# Patient Record
Sex: MALE | Race: AMERICAN INDIAN OR ALASKA NATIVE | Employment: STUDENT | URBAN - METROPOLITAN AREA
[De-identification: names, ages, dates, MRNs, and addresses within clinical notes are randomized per-mention and may not be internally consistent; named-entity substitution may affect disease eponyms.]

---

## 2022-10-25 ENCOUNTER — TELEPHONE (OUTPATIENT)
Dept: ENDOCRINOLOGY | Age: 22
End: 2022-10-25

## 2022-10-25 DIAGNOSIS — E05.90 HYPERTHYROIDISM: Primary | ICD-10-CM

## 2022-10-25 NOTE — TELEPHONE ENCOUNTER
Call from pt's Dad Carla Quiroga stating he spoke w/ Dr Barth Shames about scheduling his son as a new pt and that he had labs done in [place] and he needed to be scheduled ASAP    Told dad I did not see any notes in pt's chart regarding that info and would have to take a message    # [phone]

## 2022-10-25 NOTE — TELEPHONE ENCOUNTER
Thanks. Since he is new for [facility], there is no insurance information. Since it is a virtual visit , do we need to get it now or someone will get it on Friday.

## 2022-10-26 ENCOUNTER — TELEPHONE (OUTPATIENT)
Dept: ENDOCRINOLOGY | Age: 22
End: 2022-10-26

## 2022-10-27 DIAGNOSIS — E05.90 HYPERTHYROIDISM: ICD-10-CM

## 2022-10-27 LAB
T3 FREE: 2.8 PG/ML (ref 2.3–4.2)
T4 FREE: 1.4 NG/DL (ref 0.9–1.8)
TSH REFLEX: 0.54 UIU/ML (ref 0.27–4.2)

## 2022-10-28 ENCOUNTER — TELEMEDICINE (OUTPATIENT)
Dept: ENDOCRINOLOGY | Age: 22
End: 2022-10-28
Payer: COMMERCIAL

## 2022-10-28 DIAGNOSIS — E55.9 VITAMIN D INSUFFICIENCY: ICD-10-CM

## 2022-10-28 DIAGNOSIS — E05.90 HYPERTHYROIDISM: Primary | ICD-10-CM

## 2022-10-28 PROCEDURE — 99203 OFFICE O/P NEW LOW 30 MIN: CPT | Performed by: INTERNAL MEDICINE

## 2022-10-28 RX ORDER — CIPROFLOXACIN AND DEXAMETHASONE 3; 1 MG/ML; MG/ML
4 SUSPENSION/ DROPS AURICULAR (OTIC) 2 TIMES DAILY
COMMUNITY
Start: 2022-10-28 | End: 2022-11-04

## 2022-10-28 RX ORDER — IBUPROFEN 600 MG/1
600 TABLET ORAL EVERY 6 HOURS PRN
COMMUNITY
Start: 2022-10-28

## 2022-10-28 RX ORDER — LIDOCAINE 50 MG/G
1 PATCH TOPICAL EVERY 24 HOURS
COMMUNITY
Start: 2022-10-28

## 2022-10-28 NOTE — PROGRESS NOTES
Patient was evaluated through a synchronous (real-time) audio-video  encounter. The patient (or guardian if applicable) is aware that this is a billable service, which includes applicable co-pays. This Virtual Visit was  conducted with patient's (and/or legal guardian's) consent. The visit was conducted pursuant to the emergency declaration under the [address] [facility] authority and the [employer] and [employer] General Act. Patient identification was verified,  and a caregiver was present when appropriate. The patient was located in a state where the provider was licensed to provide care. Patient: Home  Provider location: Home    Subjective:      24 y/o  male who is here for thyroid evaluation. Referred by [facility]. He had labs which showed hyperthyroidism    End of June :  Covid-19   Out patient management    July/August difficulty staying awake  Also noted palpitations , better now  No neck pain some soreness in neck  Some headache, disorientation    9/22 TSH <0.01    FT3 4.6 FT4 1.6    TPO, Tg Ab -ve  TSI -ve    10/22 TFT normal    Current complaints: see HPI    History of radiation to patient's neck:   no  Recent iodine exposure:  no  Family history includes mom high thyroid? Dad side of family thyroid issue    Mild, controlled    No worsening factors    He had low Vitamin D    Vit D 24    B12 211    He was started on B12 and Vit D 3 29915 IU    He is okay with discussing case with his dad.       PMH:   Adjustment disorder    PSH:  Appendectomy    Current Outpatient Medications   Medication Sig Dispense Refill    ciprofloxacin-dexamethasone (CIPRODEX) 0.3-0.1 % otic suspension Place 4 drops in ear(s) 2 times daily      lidocaine (LIDODERM) 5 % Place 1 patch onto the skin every 24 hours      ibuprofen (ADVIL;MOTRIN) 600 MG tablet Take 600 mg by mouth every 6 hours as needed       No current facility-administered medications for this visit. Review of Systems  Constitutional: Negative for weight loss and malaise/fatigue. Negative for fever and chills. HENT: ear discharge, no pain, on antibitoics  Eyes: Negative for blurred vision. Negative for double vision, photophobia and pain. Respiratory:  -cough . some SOB  Cardiovascular: =+ for chest pain, previously palpitations and leg swelling. Gastrointestinal: Negative for nausea, vomiting and abdominal pain. Genitourinary: Negative for dysuria, urgency and frequency. Musculoskeletal: Negative for back pain. No joint pain  Skin: Negative for itching and rash. Neurological: Negative for dizziness. No tremors mild headache reports migraine  Endo/Heme/Allergies: see HPI  Psychiatric/Behavioral: h/o adjustment disorder  -substance abuse. Objective: There were no vitals taken for this visit. Wt Readings from Last 3 Encounters:   No data found for Wt         PHYSICAL EXAMINATION:  [ INSTRUCTIONS:  \"[x]\" Indicates a positive item  \"[]\" Indicates a negative item  -- DELETE ALL ITEMS NOT EXAMINED]  Vital Signs: (As obtained by patient/caregiver or practitioner observation)    Blood pressure-  Heart rate-    Respiratory rate-    Temperature-  Pulse oximetry-     Constitutional Appears well-developed and well-nourished No apparent distress        Mental status  Alert and awake  Oriented to person/place/time  Able to follow commands      Eyes:  EOM    [x]  Normal    Sclera  [x]  Normal           Discharge [x]  None visible      HENT:   [x] Normocephalic, atraumatic.     [x] Mouth/Throat: Mucous membranes are moist.     External Ears [x] Normal  no discharge    Neck: [x] No visualized mass  no swelling    Pulmonary/Chest: [x] Respiratory effort normal.  [x] No visualized signs of difficulty breathing or respiratory distress             Musculoskeletal:   [x] Normal gait with no signs of ataxia         [x] Normal range of motion of neck          Head and neck stable, appears normal ROM, strength good    Neurological:        [x] No Facial Asymmetry (Cranial nerve 7 motor function) (limited exam to video visit)          [x] No gaze palsy                Skin:        [x] No significant exanthematous lesions or discoloration noted on facial skin                 Psychiatric:       [x] Normal Affect [x] No Hallucinations            Other pertinent observable physical exam findings-     Due to this being a TeleHealth encounter, evaluation of the following organ systems is limited: Vitals/Constitutional/EENT/Resp/CV/GI//MS/Neuro/Skin/Heme-Lymph-Imm. Services were provided through a video synchronous discussion virtually to substitute for in-person clinic visit. Lab Review  No results found for: TSH  No results found for: FREET4      Assessment:     Hyperthyroidism: Mild, episode post Covid-19. Discussed possibility of post Covid-19 viral thyroiditis. Antibodies are negative. Levels now normal. Will monitor. Recheck to assess for hypothyroidism   Advised symptoms not due to thyroid . If persist, advised to be evaluated at [facility] for post Covid-19 persistent symptoms  Given neck symptoms, order USG    Vitamin D Insufficiency: Recommend Replacement D3, taking it. Headache/Disorientation : advised to see Neurology if symptoms persist    Plan:     1.TFT in 6 weeks  2. Await Trab  3. Thyroid USG

## 2022-10-29 LAB — TSH RECEPTOR AB: 0.87 IU/L

## 2022-11-16 LAB
BACTERIA: ABNORMAL /HPF
BILIRUBIN URINE: NEGATIVE
BLOOD, URINE: NEGATIVE
CLARITY: CLEAR
COLOR: YELLOW
EPITHELIAL CELLS, UA: 0 /HPF (ref 0–5)
GLUCOSE URINE: NEGATIVE MG/DL
HYALINE CASTS: 0 /LPF (ref 0–8)
KETONES, URINE: 40 MG/DL
LEUKOCYTE ESTERASE, URINE: NEGATIVE
MICROSCOPIC EXAMINATION: ABNORMAL
NITRITE, URINE: NEGATIVE
PH UA: 7.5 (ref 5–8)
PROTEIN UA: NEGATIVE MG/DL
RBC UA: 1 /HPF (ref 0–4)
SPECIFIC GRAVITY UA: 1.01 (ref 1–1.03)
URINE TYPE: ABNORMAL
UROBILINOGEN, URINE: 0.2 E.U./DL
WBC UA: 0 /HPF (ref 0–5)

## 2022-12-29 DIAGNOSIS — E06.9 THYROIDITIS: Primary | ICD-10-CM

## 2023-01-06 DIAGNOSIS — E55.9 VITAMIN D INSUFFICIENCY: ICD-10-CM

## 2023-01-06 DIAGNOSIS — E05.90 HYPERTHYROIDISM: ICD-10-CM

## 2023-01-07 LAB
T3 FREE: 2.5 PG/ML (ref 2.3–4.2)
T4 FREE: 0.8 NG/DL (ref 0.9–1.8)
TSH REFLEX: 4.85 UIU/ML (ref 0.27–4.2)

## 2023-01-11 ENCOUNTER — OFFICE VISIT (OUTPATIENT)
Dept: ENDOCRINOLOGY | Age: 23
End: 2023-01-11
Payer: COMMERCIAL

## 2023-01-11 VITALS
HEART RATE: 68 BPM | SYSTOLIC BLOOD PRESSURE: 120 MMHG | DIASTOLIC BLOOD PRESSURE: 66 MMHG | HEIGHT: 70 IN | WEIGHT: 183 LBS | TEMPERATURE: 98 F | BODY MASS INDEX: 26.2 KG/M2 | RESPIRATION RATE: 14 BRPM

## 2023-01-11 DIAGNOSIS — E03.9 ACQUIRED HYPOTHYROIDISM: Primary | ICD-10-CM

## 2023-01-11 PROCEDURE — 99214 OFFICE O/P EST MOD 30 MIN: CPT | Performed by: INTERNAL MEDICINE

## 2023-01-11 RX ORDER — LEVOTHYROXINE SODIUM 0.03 MG/1
TABLET ORAL
Qty: 30 TABLET | Refills: 2 | Status: SHIPPED | OUTPATIENT
Start: 2023-01-11

## 2023-01-11 RX ORDER — QUETIAPINE FUMARATE 100 MG/1
TABLET, FILM COATED ORAL
COMMUNITY

## 2023-01-11 RX ORDER — OLOPATADINE HYDROCHLORIDE 1 MG/ML
1 SOLUTION/ DROPS OPHTHALMIC 2 TIMES DAILY
COMMUNITY
Start: 2022-11-29

## 2023-01-11 RX ORDER — VILAZODONE HYDROCHLORIDE 20 MG/1
20 TABLET ORAL EVERY MORNING
COMMUNITY
Start: 2022-12-05

## 2023-01-11 NOTE — PROGRESS NOTES
Subjective:      26 y/o  male who is here for thyroid evaluation. Referred by [facility]. Interim:    +fatigue  No difficulty awake  No sleep issues  Constipation, fatigue    Will see neurology Dr. Markie Gillette    He had labs which showed hyperthyroidism    End of June :  Covid-19   Out patient management    July/August difficulty staying awake  Also noted palpitations , better now  No neck pain some soreness in neck  Some headache, disorientation    9/22 TSH <0.01    FT3 4.6 FT4 1.6    TPO, Tg Ab -ve  TSI -ve    10/22 TFT normal    Current complaints: see HPI    History of radiation to patient's neck:   no  Recent iodine exposure:  no  Family history includes mom high thyroid? Dad side of family thyroid issue    Mild, controlled    No worsening factors    He had low Vitamin D    Vit D 24    B12 211    He was started on B12 and Vit D 3 34698 IU    He is okay with discussing case with his dad. 10/22 TSH 0.54  1/23 TSH 4.85 FT4 0.8      PMH:   Adjustment disorder    PSH:  Appendectomy    Current Outpatient Medications   Medication Sig Dispense Refill    olopatadine (PATANOL) 0.1 % ophthalmic solution Apply 1 drop to eye 2 times daily      QUEtiapine (SEROQUEL) 100 MG tablet       vilazodone HCl (VIIBRYD) 20 MG TABS Take 20 mg by mouth every morning       No current facility-administered medications for this visit. Review of Systems    Scanned, reviewed    Vitals:    01/11/23 1012   BP: 120/66   Pulse: 68   Resp: 14   Temp: 98 °F (36.7 °C)       Constitutional: Well-developed, appears stated age, cooperative, in no acute distress  H/E/N/M/T:atraumatic, normocephalic, external ears, nose, lips normal without lesions  Eyes: Lids, lashes, conjunctivae and sclerae normal, No proptosis, no redness  Neck: supple, symmetrical, no swelling  Skin: No obvious rashes or lesions present.   Skin and hair texture normal  Psychiatric: Judgement and Insight:  judgement and insight appear normal  Neuro: Normal without focal findings, speech is normal normal, speech is spontaneous  Chest: No labored breathing, no chest deformity, no stridor  Musculoskeletal: No joint deformity, swelling      Lab Review  No results found for: TSH  No results found for: FREET4      Assessment:     Hyperthyroidism: Mild, episode post Covid-19. Discussed possibility of post Covid-19 viral thyroiditis. Antibodies are negative. Levels normal 10/22. Last level showed hypothyroidism. Discussed course of the thyroiditis and the hypothyroidism may be temporary or permanent. Discussed monitoring vs replacement. Discussed given symptoms, can start replacement and assess if improves. Reassess in 2 months if need to continue    Vitamin D Insufficiency: Recommend Replacement D3, taking it. Headache/Disorientation : advised to see Neurology if symptoms persist    Plan: 1. Start levothyroxine 25mcg  2. TFT in 2 months

## 2023-04-07 DIAGNOSIS — E06.9 THYROIDITIS: ICD-10-CM

## 2023-04-07 LAB
T3FREE SERPL-MCNC: 2.9 PG/ML (ref 2.3–4.2)
T4 FREE SERPL-MCNC: 1.1 NG/DL (ref 0.9–1.8)
TSH SERPL DL<=0.005 MIU/L-ACNC: 3.5 UIU/ML (ref 0.27–4.2)

## 2023-07-13 RX ORDER — LEVOTHYROXINE SODIUM 0.03 MG/1
TABLET ORAL
Qty: 30 TABLET | Refills: 0 | OUTPATIENT
Start: 2023-07-13

## 2023-07-16 RX ORDER — LEVOTHYROXINE SODIUM 0.03 MG/1
TABLET ORAL
Qty: 30 TABLET | Refills: 2 | Status: SHIPPED | OUTPATIENT
Start: 2023-07-16

## 2023-07-17 RX ORDER — LEVOTHYROXINE SODIUM 0.03 MG/1
TABLET ORAL
Qty: 30 TABLET | Refills: 0 | OUTPATIENT
Start: 2023-07-17

## 2023-07-17 RX ORDER — LEVOTHYROXINE SODIUM 0.03 MG/1
TABLET ORAL
Qty: 30 TABLET | Refills: 2 | OUTPATIENT
Start: 2023-07-17

## 2023-09-19 RX ORDER — LEVOTHYROXINE SODIUM 0.03 MG/1
TABLET ORAL
Qty: 30 TABLET | Refills: 0 | Status: SHIPPED | OUTPATIENT
Start: 2023-09-19

## 2023-09-19 NOTE — TELEPHONE ENCOUNTER
Medication:   Requested Prescriptions     Pending Prescriptions Disp Refills    levothyroxine (SYNTHROID) 25 MCG tablet [Pharmacy Med Name: Levothyroxine Sodium Oral Tablet 25 MCG] 30 tablet 3     Sig: TAKE 1 TABLET BY MOUTH EVERY DAY       Last Filled:      Patient Phone Number: [phone] (home)     Last appt: 1/11/2023  Next appt: Visit date not found    Last Thyroid:   Lab Results   Component Value Date/Time    FT3 2.9 04/07/2023 03:47 PM    T4FREE 1.1 04/07/2023 03:47 PM

## 2023-10-30 ENCOUNTER — OFFICE VISIT (OUTPATIENT)
Dept: PULMONOLOGY | Age: 23
End: 2023-10-30
Payer: COMMERCIAL

## 2023-10-30 VITALS
RESPIRATION RATE: 16 BRPM | BODY MASS INDEX: 28.77 KG/M2 | HEIGHT: 70 IN | OXYGEN SATURATION: 98 % | DIASTOLIC BLOOD PRESSURE: 60 MMHG | TEMPERATURE: 97.3 F | SYSTOLIC BLOOD PRESSURE: 110 MMHG | WEIGHT: 201 LBS | HEART RATE: 79 BPM

## 2023-10-30 DIAGNOSIS — G47.00 INSOMNIA, UNSPECIFIED TYPE: ICD-10-CM

## 2023-10-30 DIAGNOSIS — G47.10 HYPERSOMNOLENCE: ICD-10-CM

## 2023-10-30 DIAGNOSIS — G47.33 OSA (OBSTRUCTIVE SLEEP APNEA): Primary | ICD-10-CM

## 2023-10-30 PROCEDURE — G8484 FLU IMMUNIZE NO ADMIN: HCPCS | Performed by: INTERNAL MEDICINE

## 2023-10-30 PROCEDURE — 99203 OFFICE O/P NEW LOW 30 MIN: CPT | Performed by: INTERNAL MEDICINE

## 2023-10-30 PROCEDURE — G8427 DOCREV CUR MEDS BY ELIG CLIN: HCPCS | Performed by: INTERNAL MEDICINE

## 2023-10-30 PROCEDURE — 1036F TOBACCO NON-USER: CPT | Performed by: INTERNAL MEDICINE

## 2023-10-30 PROCEDURE — G8419 CALC BMI OUT NRM PARAM NOF/U: HCPCS | Performed by: INTERNAL MEDICINE

## 2023-10-30 RX ORDER — QUETIAPINE FUMARATE 150 MG/1
1 TABLET, FILM COATED ORAL
COMMUNITY
Start: 2023-07-27

## 2023-10-30 RX ORDER — HYDROXYZINE HYDROCHLORIDE 25 MG/1
TABLET, FILM COATED ORAL
COMMUNITY
Start: 2023-10-16

## 2023-10-30 ASSESSMENT — SLEEP AND FATIGUE QUESTIONNAIRES
NECK CIRCUMFERENCE (INCHES): 13.5
HOW LIKELY ARE YOU TO NOD OFF OR FALL ASLEEP WHILE WATCHING TV: 0
HOW LIKELY ARE YOU TO NOD OFF OR FALL ASLEEP IN A CAR, WHILE STOPPED FOR A FEW MINUTES IN TRAFFIC: 0
HOW LIKELY ARE YOU TO NOD OFF OR FALL ASLEEP WHILE SITTING AND READING: 2
HOW LIKELY ARE YOU TO NOD OFF OR FALL ASLEEP WHILE SITTING AND TALKING TO SOMEONE: 0
HOW LIKELY ARE YOU TO NOD OFF OR FALL ASLEEP WHILE SITTING QUIETLY AFTER LUNCH WITHOUT ALCOHOL: 0
HOW LIKELY ARE YOU TO NOD OFF OR FALL ASLEEP WHEN YOU ARE A PASSENGER IN A CAR FOR AN HOUR WITHOUT A BREAK: 0
ESS TOTAL SCORE: 5
HOW LIKELY ARE YOU TO NOD OFF OR FALL ASLEEP WHILE SITTING INACTIVE IN A PUBLIC PLACE: 0
HOW LIKELY ARE YOU TO NOD OFF OR FALL ASLEEP WHILE LYING DOWN TO REST IN THE AFTERNOON WHEN CIRCUMSTANCES PERMIT: 3

## 2023-10-30 NOTE — PROGRESS NOTES
Huy Salas (: 2000 ) is a 21 y.o. male here for an evaluation of   Chief Complaint   Patient presents with    [facility]     Sleep Eval         ASSESSMENT/PLAN:   Diagnosis Orders   1. IAN (obstructive sleep apnea)        2. Insomnia, unspecified type        3. Hypersomnolence                   I  RECOMMENDATIONS:     he will be scheduled for polysomnography in order to evaluate for the presence and severity of obstructive sleep apnea. He was given a discussion of the pathophysiology, evaluation and treatment of apnea. Thyroid function tests are recommended if not done recently. Advised to avoid driving when too sleepy to function safely and given a discussion of the risks of untreated apnea such as accidents, cognitive impairment, mood impairment, high blood pressure, various cardiac diseases and stroke. ESS is     Neck circumference (Inches): 13.5  Mallampati class 2        Will get Sleep study  I will call you with results            No follow-ups on file. SUBJECTIVE/OBJECTIVE:    Self referral for sleep apnea  Initially seen 10/30/23      Subjective:              21old year old,male, with PMH significant for thyroid and insomnia,  that presents today for initial evaluation. Pt reports sleepiness and snoring for years  and that it is getting same. Taking   Seroquel 150  Hydroxyzine 20    Levothry 25 mcg    Vilazone      Pt reports does snore mild for years. Pt's  does wake   himself with dry mouth, sweating, GERD. Pt does report fatigue or tiredness frequently. Pt sleeps more than 7 hours, and at times overwhelming sleepiness attacks. Pt  does dozes unintentionally while watching TV. While driving  does feel drowsy / nod off / fall sleep at stop lights. Pt does not have h/o sleepiness associated wrecks/near wrecks. Pt does nod off while  unattended. Pt does not report having restless legs 0 times a week.  This is often accompanied by leg jerks during sleep, numbness

## 2023-10-30 NOTE — PATIENT INSTRUCTIONS
ASSESSMENT/PLAN:   Diagnosis Orders   1. IAN (obstructive sleep apnea)        2. Insomnia, unspecified type        3. Hypersomnolence                   I  RECOMMENDATIONS:     he will be scheduled for polysomnography in order to evaluate for the presence and severity of obstructive sleep apnea. He was given a discussion of the pathophysiology, evaluation and treatment of apnea. Thyroid function tests are recommended if not done recently. Advised to avoid driving when too sleepy to function safely and given a discussion of the risks of untreated apnea such as accidents, cognitive impairment, mood impairment, high blood pressure, various cardiac diseases and stroke. ESS is 11/24    Neck circumference (Inches): 13.5  Mallampati class 2        Will get Sleep study  I will call you with results  Remember to bring a list of pulmonary medications and any CPAP or BiPAP machines to your next appointment with the office. Please keep all of your future appointments scheduled by [facility] office. Out of respect for other patients and providers, you may be asked to reschedule your appointment if you arrive later than your scheduled appointment time. Appointments cancelled less than 24hrs in advance will be considered a no show. Patients with three missed appointments within 1 year or four missed appointments within 2 years can be dismissed from the practice. Please be aware that our physicians are required to work in the Intensive Care Unit at [facility].  Your appointment may need to be rescheduled if they are designated to work during your appointment time. You may receive a survey regarding the care you received during your visit. Your input is valuable to us. We encourage you to complete and return your survey. We hope you will choose us in the future for your healthcare needs.      Pt instructed of all future appointment dates & times, including

## 2023-10-30 NOTE — PROGRESS NOTES
MA Communication:   The following orders are received by verbal communication from Sagar Guzmán MD    Orders include:  Sleep study, will call with results

## 2023-11-09 RX ORDER — LEVOTHYROXINE SODIUM 0.03 MG/1
TABLET ORAL
Qty: 30 TABLET | Refills: 0 | OUTPATIENT
Start: 2023-11-09

## 2023-12-06 DIAGNOSIS — E03.9 ACQUIRED HYPOTHYROIDISM: ICD-10-CM

## 2023-12-06 LAB
T3FREE SERPL-MCNC: 2.6 PG/ML (ref 2.3–4.2)
T4 FREE SERPL-MCNC: 1.2 NG/DL (ref 0.9–1.8)
TSH SERPL DL<=0.005 MIU/L-ACNC: 1.9 UIU/ML (ref 0.27–4.2)

## 2023-12-14 ENCOUNTER — TELEPHONE (OUTPATIENT)
Dept: ENDOCRINOLOGY | Age: 23
End: 2023-12-14

## 2023-12-14 RX ORDER — LEVOTHYROXINE SODIUM 0.03 MG/1
TABLET ORAL
Qty: 30 TABLET | Refills: 2 | Status: SHIPPED | OUTPATIENT
Start: 2023-12-14 | End: 2023-12-16 | Stop reason: SDUPTHER

## 2023-12-14 NOTE — TELEPHONE ENCOUNTER
Please advise patient I reviewed the labs and sent the medication. I would like to see him in 3 months to assess and discuss if needs long term.  He can schedule a 3 month f/u

## 2023-12-16 RX ORDER — LEVOTHYROXINE SODIUM 0.03 MG/1
TABLET ORAL
Qty: 30 TABLET | Refills: 2 | Status: SHIPPED | OUTPATIENT
Start: 2023-12-16

## 2024-03-03 DIAGNOSIS — E03.9 ACQUIRED HYPOTHYROIDISM: Primary | ICD-10-CM

## 2024-03-04 RX ORDER — LEVOTHYROXINE SODIUM 0.03 MG/1
TABLET ORAL
Qty: 30 TABLET | Refills: 1 | Status: SHIPPED | OUTPATIENT
Start: 2024-03-04

## 2024-03-04 NOTE — TELEPHONE ENCOUNTER
Requested Prescriptions     Pending Prescriptions Disp Refills    levothyroxine (SYNTHROID) 25 MCG tablet [Pharmacy Med Name: Levothyroxine Sodium 25mcg Tablet] 30 tablet 1     Sig: Take 1 tablet by mouth daily.         [facility] - [place], [place] - [address] [facility] [address] - P [phone] - F [phone]  [address] [facility] [address]  [facility] [address]  Phone: [phone] Fax: [phone]      Last OV 01/11/2023  Next OV 03/26/2024    Plan:      1.Start levothyroxine 25mcg  2.TFT in 2 months

## 2024-04-12 DIAGNOSIS — E03.9 ACQUIRED HYPOTHYROIDISM: ICD-10-CM

## 2024-04-12 RX ORDER — LEVOTHYROXINE SODIUM 0.03 MG/1
TABLET ORAL
Qty: 30 TABLET | Refills: 0 | OUTPATIENT
Start: 2024-04-12

## 2024-07-10 DIAGNOSIS — E03.9 ACQUIRED HYPOTHYROIDISM: ICD-10-CM

## 2024-07-10 RX ORDER — LEVOTHYROXINE SODIUM 0.03 MG/1
TABLET ORAL
Qty: 30 TABLET | Refills: 1 | Status: SHIPPED | OUTPATIENT
Start: 2024-07-10

## 2024-08-04 DIAGNOSIS — E03.9 ACQUIRED HYPOTHYROIDISM: ICD-10-CM

## 2024-08-05 RX ORDER — LEVOTHYROXINE SODIUM 0.03 MG/1
TABLET ORAL
Qty: 30 TABLET | Refills: 3 | Status: SHIPPED | OUTPATIENT
Start: 2024-08-05

## 2024-08-05 NOTE — TELEPHONE ENCOUNTER
Medication:   Requested Prescriptions     Signed Prescriptions Disp Refills    levothyroxine (SYNTHROID) 25 MCG tablet 30 tablet 3     Sig: Take 1 tablet by mouth daily.     Authorizing Provider: ANTONINO MCCAIN     Ordering User: MARTIR CANTU       Last Filled:      Patient Phone Number: [phone] (home)     Last appt: 1/11/2023   Next appt: 8/30/2024    Last Thyroid:   Lab Results   Component Value Date/Time    TSH 1.90 12/06/2023 03:06 PM    FT3 2.6 12/06/2023 03:06 PM    T4FREE 1.2 12/06/2023 03:06 PM

## 2024-08-30 ENCOUNTER — TELEMEDICINE (OUTPATIENT)
Dept: ENDOCRINOLOGY | Age: 24
End: 2024-08-30
Payer: COMMERCIAL

## 2024-08-30 DIAGNOSIS — E03.9 ACQUIRED HYPOTHYROIDISM: ICD-10-CM

## 2024-08-30 PROCEDURE — G8427 DOCREV CUR MEDS BY ELIG CLIN: HCPCS | Performed by: INTERNAL MEDICINE

## 2024-08-30 PROCEDURE — 99213 OFFICE O/P EST LOW 20 MIN: CPT | Performed by: INTERNAL MEDICINE

## 2024-08-30 RX ORDER — LEVOTHYROXINE SODIUM 25 UG/1
TABLET ORAL
Qty: 30 TABLET | Refills: 11 | Status: SHIPPED | OUTPATIENT
Start: 2024-08-30

## 2024-08-30 NOTE — PROGRESS NOTES
Patient was evaluated through a synchronous (real-time) audio-video encounter. The patient (or guardian if applicable) is aware that this is a billable service, which includes applicable co-pays. This Virtual Visit was conducted with patient's (and/or legal guardian's) consent. Patient identification was verified, and a caregiver was present when appropriate.   The [place], [place]:   Provider was [place], [place]    STOP! Confirm you are appropriately licensed, registered, or certified to deliver care in the state where the patient is located as indicated above. If you are not or unsure, please re-schedule the visit.    Are you appropriately licensed in the patient's state?: Yes        Subjective:      23 y/o  male who is here for thyroid evaluation.     Referred by Juliette Card.     Interim:    Stable  No issues    He had labs which showed hyperthyroidism    End of June :  Covid-19   Out patient management    July/August difficulty staying awake  Also noted palpitations , better now  No neck pain some soreness in neck  Some headache, disorientation    9/22 TSH <0.01    FT3 4.6 FT4 1.6    TPO, Tg Ab -ve  TSI -ve    10/22 TFT normal    Current complaints: see HPI    History of radiation to patient's neck:   no  Recent iodine exposure:  no  Family history includes mom high thyroid?  Dad side of family thyroid issue    Mild, controlled    No worsening factors    He had low Vitamin D    Vit D 24    B12 211    He was started on B12 and Vit D 3 83178 IU    He is okay with discussing case with his dad.    10/22 TSH 0.54  1/23 TSH 4.85 FT4 0.8    He is being treated for hypothyroidism    On levothyroxine 25mcg    PMH:   Adjustment disorder    PSH:  Appendectomy    Current Outpatient Medications   Medication Sig Dispense Refill    levothyroxine (SYNTHROID) 25 MCG tablet Take 1 tablet by mouth daily. 30 tablet 3    hydrOXYzine HCl (ATARAX) 25 MG tablet       QUEtiapine Fumarate 150 MG TABS Take 1 tablet

## 2024-10-04 DIAGNOSIS — E03.9 ACQUIRED HYPOTHYROIDISM: ICD-10-CM

## 2024-10-04 LAB — TSH SERPL DL<=0.005 MIU/L-ACNC: 3.93 UIU/ML (ref 0.27–4.2)

## 2025-08-18 DIAGNOSIS — E03.9 ACQUIRED HYPOTHYROIDISM: ICD-10-CM

## 2025-08-18 RX ORDER — LEVOTHYROXINE SODIUM 25 UG/1
TABLET ORAL
Qty: 30 TABLET | Refills: 0 | Status: SHIPPED | OUTPATIENT
Start: 2025-08-18